# Patient Record
Sex: MALE | Race: WHITE
[De-identification: names, ages, dates, MRNs, and addresses within clinical notes are randomized per-mention and may not be internally consistent; named-entity substitution may affect disease eponyms.]

---

## 2018-08-29 ENCOUNTER — HOSPITAL ENCOUNTER (INPATIENT)
Dept: HOSPITAL 14 - H.ER | Age: 43
LOS: 2 days | Discharge: HOME | DRG: 427 | End: 2018-08-31
Attending: PSYCHIATRY & NEUROLOGY | Admitting: PSYCHIATRY & NEUROLOGY
Payer: MEDICAID

## 2018-08-29 DIAGNOSIS — F12.90: ICD-10-CM

## 2018-08-29 DIAGNOSIS — G43.909: ICD-10-CM

## 2018-08-29 DIAGNOSIS — G89.29: ICD-10-CM

## 2018-08-29 DIAGNOSIS — F43.20: Primary | ICD-10-CM

## 2018-08-29 DIAGNOSIS — E78.00: ICD-10-CM

## 2018-08-29 DIAGNOSIS — K21.9: ICD-10-CM

## 2018-08-29 DIAGNOSIS — E11.40: ICD-10-CM

## 2018-08-29 DIAGNOSIS — R45.851: ICD-10-CM

## 2018-08-29 DIAGNOSIS — Z79.4: ICD-10-CM

## 2018-08-29 DIAGNOSIS — Z88.0: ICD-10-CM

## 2018-08-29 LAB
ALBUMIN SERPL-MCNC: 4.6 G/DL (ref 3.5–5)
ALBUMIN/GLOB SERPL: 1.6 {RATIO} (ref 1–2.1)
ALT SERPL-CCNC: 33 U/L (ref 21–72)
AST SERPL-CCNC: 21 U/L (ref 17–59)
BASOPHILS # BLD AUTO: 0 K/UL (ref 0–0.2)
BASOPHILS NFR BLD: 0.8 % (ref 0–2)
BILIRUB UR-MCNC: NEGATIVE MG/DL
BUN SERPL-MCNC: 19 MG/DL (ref 9–20)
CALCIUM SERPL-MCNC: 9.2 MG/DL (ref 8.4–10.2)
COLOR UR: YELLOW
EOSINOPHIL # BLD AUTO: 0.1 K/UL (ref 0–0.7)
EOSINOPHIL NFR BLD: 1.5 % (ref 0–4)
ERYTHROCYTE [DISTWIDTH] IN BLOOD BY AUTOMATED COUNT: 13.2 % (ref 11.5–14.5)
GFR NON-AFRICAN AMERICAN: > 60
GLUCOSE UR STRIP-MCNC: 150 MG/DL
HGB BLD-MCNC: 15.5 G/DL (ref 12–18)
LEUKOCYTE ESTERASE UR-ACNC: (no result) LEU/UL
LYMPHOCYTES # BLD AUTO: 1.2 K/UL (ref 1–4.3)
LYMPHOCYTES NFR BLD AUTO: 26.9 % (ref 20–40)
MCH RBC QN AUTO: 31.4 PG (ref 27–31)
MCHC RBC AUTO-ENTMCNC: 34.4 G/DL (ref 33–37)
MCV RBC AUTO: 91.2 FL (ref 80–94)
MONOCYTES # BLD: 0.3 K/UL (ref 0–0.8)
MONOCYTES NFR BLD: 6.6 % (ref 0–10)
NEUTROPHILS # BLD: 2.8 K/UL (ref 1.8–7)
NEUTROPHILS NFR BLD AUTO: 64.2 % (ref 50–75)
NRBC BLD AUTO-RTO: 0 % (ref 0–0)
PH UR STRIP: 5 [PH] (ref 5–8)
PLATELET # BLD: 227 K/UL (ref 130–400)
PMV BLD AUTO: 9 FL (ref 7.2–11.7)
PROT UR STRIP-MCNC: 100 MG/DL
RBC # BLD AUTO: 4.93 MIL/UL (ref 4.4–5.9)
RBC # UR STRIP: NEGATIVE /UL
SP GR UR STRIP: 1.03 (ref 1–1.03)
SQUAMOUS EPITHIAL: < 1 /HPF (ref 0–5)
URINE CLARITY: (no result)
URINE HYALINE CAST: (no result) /HPF (ref 0–2)
UROBILINOGEN UR-MCNC: 2 MG/DL (ref 0.2–1)
WBC # BLD AUTO: 4.3 K/UL (ref 4.8–10.8)

## 2018-08-29 NOTE — ED PDOC
HPI: Psych/Substance Abuse


Time Seen by Provider: 18 19:37


Chief Complaint (Nursing): Psychiatric Evaluation


Chief Complaint (Provider): crisis eval


History Per: Patient, EMS


Additional Complaint(s): 


41 y/o male presents for crisis eval.  Patient had an argument at home with 

parents and patient states the neighbors called police and patient was told to 

come to ED.  Patient denies any thoughts of wanting to harm himself or other 

people.  As per EMS, patient's sister called the police after the patient told 

his sister that he wanted to harm himself.  Patient complains of chronic pain 

secondary to diabetic neuropathy.  He admits to smoking marijuana daily to help 

relieve chronic pain. Patient also states he has had frequent headaches lately 

and episodes of memory loss and he is currently under the care of a 

neurologist. He denies headache at present. 





PMD:  Dr. Israel





Past Medical History


Reviewed: Historical Data, Nursing Documentation, Vital Signs


Vital Signs: 





 Last Vital Signs











Temp  98.2 F   18 19:33


 


Pulse  102 H  18 19:33


 


Resp  20   18 19:33


 


BP  143/79   18 19:33


 


Pulse Ox  99   18 19:33














- Medical History


PMH: Anxiety, Diabetes, Migraine





- Family History


Family History: States: No Known Family Hx





- Living Arrangements


Living Arrangements: With Family





- Social History


Current smoker - smoking cessation education provided: No


Alcohol: None


Drugs: Cannabis





- Allergies


Allergies/Adverse Reactions: 


 Allergies











Allergy/AdvReac Type Severity Reaction Status Date / Time


 


Penicillins Allergy  RASH Verified 18 19:33














Review of Systems


ROS Statement: Except As Marked, All Systems Reviewed And Found Negative


Musculoskeletal: Positive for: Other (chronic neuropathic pain to legs)


Neurological: Positive for: Headache (frequent headaches), Other (memory loss).

  Negative for: Confusion, Seizures, Dizziness


Psych: Positive for: Suicidal ideation





Physical Exam





- Reviewed


Nursing Documentation Reviewed: Yes


Vital Signs Reviewed: Yes





- Physical Exam


Appears: Positive for: Well, Non-toxic, No Acute Distress


Skin: Positive for: Normal Color.  Negative for: Rash


Eye Exam: Positive for: Normal appearance


Cardiovascular/Chest: Positive for: Regular Rate, Rhythm


Respiratory: Positive for: Normal Breath Sounds.  Negative for: Wheezing, 

Respiratory Distress


Back: Positive for: Normal Inspection


Extremity: Positive for: Normal ROM


Neurologic/Psych: Positive for: Alert, CNs II-XII (grossly intact), Oriented, 

Gait (steady with cane used at baseline).  Negative for: Motor/Sensory Deficits

, Aphasia, Facial Droop





- Laboratory Results


Result Diagrams: 


 18 20:46





 18 20:46





- ECG


Interpretation Of ECG: 


NSR 66 bpm, no acute finding, reviewed by PA and ED attending


O2 Sat by Pulse Oximetry: 99


Pulse Ox Interpretation: Normal





- Other Rad


  ** CXR


X-Ray: Interpreted by Me, Viewed By Me


X-Ray Interpretation: no acute finding





  ** CT head


X-Ray: Read By Radiologist


X-Ray Interpretation: no acute finding





Medical Decision Making


Medical Decision Makin year old male with suicidal ideation





Plan:


1:1 observation


Crisis eval


CBC


CMP 


BAL


UDS


UA


CT head





As per crisis counselor and psychiatrist on call, Dr. Moon, patient does 

meet criteria for admission. Patient agrees and signed himself in. Patient is 

medically stable for psychiatric admission.





Disposition





- Clinical Impression


Clinical Impression: 


 Adjustment disorder








- Patient ED Disposition


Is Patient to be Admitted: Yes





- Disposition


Disposition Time: 00:02


Condition: FAIR


Forms:  [facility] (English)





Results





- Lab Results


Lab Results: 

















  18





  20:46 20:46 20:46


 


WBC    4.3 L


 


RBC    4.93


 


Hgb    15.5


 


Hct    44.9


 


MCV    91.2


 


MCH    31.4 H


 


MCHC    34.4


 


RDW    13.2


 


Plt Count    227


 


MPV    9.0


 


Neut % (Auto)    64.2


 


Lymph % (Auto)    26.9


 


Mono % (Auto)    6.6


 


Eos % (Auto)    1.5


 


Baso % (Auto)    0.8


 


Neut # (Auto)    2.8


 


Lymph # (Auto)    1.2


 


Mono # (Auto)    0.3


 


Eos # (Auto)    0.1


 


Baso # (Auto)    0.0


 


Sodium   


 


Potassium   


 


Chloride   


 


Carbon Dioxide   


 


Anion Gap   


 


BUN   


 


Creatinine   


 


Est GFR ( Amer)   


 


Est GFR (Non-Af Amer)   


 


POC Glucose (mg/dL)   


 


Random Glucose   


 


Calcium   


 


Total Bilirubin   


 


AST   


 


ALT   


 


Alkaline Phosphatase   


 


Total Protein   


 


Albumin   


 


Globulin   


 


Albumin/Globulin Ratio   


 


Urine Color  Yellow  


 


Urine Clarity  Slighty-cloudy  


 


Urine pH  5.0  


 


Ur Specific Gravity  1.030  


 


Urine Protein  100  


 


Urine Glucose (UA)  150  


 


Urine Ketones  20  


 


Urine Blood  Negative  


 


Urine Nitrate  Negative  


 


Urine Bilirubin  Negative  


 


Urine Urobilinogen  2.0  


 


Ur Leukocyte Esterase  Trace  


 


Urine RBC (Auto)  1  


 


Urine Microscopic WBC  5  


 


Ur Squamous Epith Cells  < 1  


 


Hyaline Casts  0-2  


 


Urine Opiates Screen   Negative 


 


Urine Methadone Screen   Negative 


 


Ur Barbiturates Screen   Negative 


 


Ur Phencyclidine Scrn   Negative 


 


Ur Amphetamines Screen   Positive H 


 


U Benzodiazepines Scrn   Negative 


 


U Oth Cocaine Metabols   Negative 


 


U Cannabinoids Screen   Positive H 


 


Alcohol, Quantitative   














  18





  20:46 20:24


 


WBC  


 


RBC  


 


Hgb  


 


Hct  


 


MCV  


 


MCH  


 


MCHC  


 


RDW  


 


Plt Count  


 


MPV  


 


Neut % (Auto)  


 


Lymph % (Auto)  


 


Mono % (Auto)  


 


Eos % (Auto)  


 


Baso % (Auto)  


 


Neut # (Auto)  


 


Lymph # (Auto)  


 


Mono # (Auto)  


 


Eos # (Auto)  


 


Baso # (Auto)  


 


Sodium  140 


 


Potassium  3.9 


 


Chloride  106 


 


Carbon Dioxide  24 


 


Anion Gap  14 


 


BUN  19 


 


Creatinine  0.6 L 


 


Est GFR ( Amer)  > 60 


 


Est GFR (Non-Af Amer)  > 60 


 


POC Glucose (mg/dL)   151 H


 


Random Glucose  167 H 


 


Calcium  9.2 


 


Total Bilirubin  1.0 


 


AST  21 


 


ALT  33 


 


Alkaline Phosphatase  64 


 


Total Protein  7.5 


 


Albumin  4.6 


 


Globulin  2.9 


 


Albumin/Globulin Ratio  1.6 


 


Urine Color  


 


Urine Clarity  


 


Urine pH  


 


Ur Specific Gravity  


 


Urine Protein  


 


Urine Glucose (UA)  


 


Urine Ketones  


 


Urine Blood  


 


Urine Nitrate  


 


Urine Bilirubin  


 


Urine Urobilinogen  


 


Ur Leukocyte Esterase  


 


Urine RBC (Auto)  


 


Urine Microscopic WBC  


 


Ur Squamous Epith Cells  


 


Hyaline Casts  


 


Urine Opiates Screen  


 


Urine Methadone Screen  


 


Ur Barbiturates Screen  


 


Ur Phencyclidine Scrn  


 


Ur Amphetamines Screen  


 


U Benzodiazepines Scrn  


 


U Oth Cocaine Metabols  


 


U Cannabinoids Screen  


 


Alcohol, Quantitative  < 10

## 2018-08-30 VITALS — OXYGEN SATURATION: 100 %

## 2018-08-30 LAB
HDLC SERPL-MCNC: 55 MG/DL (ref 30–70)
LDLC SERPL-MCNC: 58 MG/DL (ref 0–129)
T4 SERPL-MCNC: 7.94 UG/DL (ref 5.5–11)

## 2018-08-30 PROCEDURE — HZ56ZZZ INDIVIDUAL PSYCHOTHERAPY FOR SUBSTANCE ABUSE TREATMENT, PSYCHOEDUCATION: ICD-10-PCS | Performed by: PSYCHIATRY & NEUROLOGY

## 2018-08-30 NOTE — CP.PCM.CON
History of Present Illness





- History of Present Illness


History of Present Illness: 





Medical consult for DM- management





CC: says had fight with sister who used the fact that he said he would not want 

to live against him





HPI: 43 yo Male pmh of DM and DM neuropathy here for suicidal ideation. no 

medical complaints. no cp no sob, no fevers, no chills, no pain. 





PMH: DM on insulin


DM neuropathy


high chol


Gerd


Migranes





PSH: 


none





FH: mental ilness father





SH: vape marijuana


ETOH


no drugs





Allergies: 


PCH


propelyne glycol





PMD: Dr. ANITA Israel (male)




















Review of Systems





- Review of Systems


All systems: reviewed and no additional remarkable complaints except





Past Patient History





- Past Social History


Smoking Status: Never Smoked


Alcohol: None


Drugs: Cannabis


Home Situation {Lives}: With Family





- CARDIAC


Hx Cardiac Disorders: Yes


Hx Hypercholesterolemia: Yes





- PULMONARY


Hx Respiratory Disorders: No





- NEUROLOGICAL


Hx Neurological Disorder: Yes


Hx Migraine: Yes





- HEENT


Hx HEENT Problems: Yes


Hx Glaucoma: Yes





- RENAL


Hx Chronic Kidney Disease: No





- ENDOCRINE/METABOLIC


Hx Endocrine Disorders: Yes (DIABETES)





- HEMATOLOGICAL/ONCOLOGICAL


Hx Blood Disorders: No





- INTEGUMENTARY


Hx Dermatological Problems: No





- MUSCULOSKELETAL/RHEUMATOLOGICAL


Hx Musculoskeletal Disorders: Yes


Hx Unsteady Gait: Yes (uses cane)


Other/Comment: diabetic neuropathy





- GASTROINTESTINAL


Hx Gastrointestinal Disorders: No





- GENITOURINARY/GYNECOLOGICAL


Hx Genitourinary Disorders: No





- PSYCHIATRIC


Hx Psychophysiologic Disorder: Yes





- SURGICAL HISTORY


Hx Surgeries: No





- ANESTHESIA


Hx Anesthesia: No





Meds


Allergies/Adverse Reactions: 


 Allergies











Allergy/AdvReac Type Severity Reaction Status Date / Time


 


Penicillins Allergy  RASH Verified 08/29/18 19:33














- Medications


Medications: 


 Current Medications





Acetaminophen (Tylenol 325mg Tab)  650 mg PO Q4 PRN


   PRN Reason: Pain, moderate (4-7)


Al Hydrox/Mg Hydrox/Simethicone (Maalox Plus 30 Ml)  30 ml PO Q4 PRN


   PRN Reason: Dyspepsia


Cyclobenzaprine HCl (Flexeril)  10 mg PO HS PRN


   PRN Reason: Pain, moderate (4-7)


Dextrose (Dextrose 50% Inj)  0 ml IV STAT PRN; Protocol


   PRN Reason: Hypoglycemia Protocol


Dextrose (Glutose 15)  0 gm PO ONCE PRN; Protocol


   PRN Reason: Hypoglycemia Protocol


Diphenhydramine HCl (Benadryl)  50 mg IM Q6 PRN


   PRN Reason: Extrapyramidal S/S Unable PO


Diphenhydramine HCl (Benadryl)  50 mg PO Q6 PRN


   PRN Reason: Extrapyramidal Symptoms


Diphenhydramine HCl (Benadryl)  50 mg PO HS PRN


   PRN Reason: Sleep


Gabapentin (Neurontin)  400 mg PO TID [facility]


   Last Admin: 08/30/18 17:40 Dose:  400 mg


Glucagon (Glucagen Diagnostic Kit)  0 mg IM STAT PRN; Protocol


   PRN Reason: Hypoglycemia Protocol


Haloperidol (Haldol)  5 mg PO Q4 PRN


   PRN Reason: Agitation


Haloperidol Lactate (Haldol)  5 mg IM Q4 PRN


   PRN Reason: Agitation, Unable to Take PO


Home Med (Diclofenac Sodium [Diclofenac Sodium])  50 mg PO BID [facility]


Home Med (Sumatriptan Succinate [Imitrex Tab])  100 mg PO BID PRN


   PRN Reason: Insomnia


Insulin Human Regular (Humulin R)  0 units SC ACHS WANDA


   PRN Reason: Protocol


Lorazepam (Ativan)  1 mg IM Q8 PRN


   PRN Reason: Anxiety/Agitation,Unable PO


Lorazepam (Ativan)  0.5 mg PO TID PRN


   PRN Reason: Anxiety


Magnesium Hydroxide (Milk Of Magnesia)  30 ml PO HS PRN


   PRN Reason: Constipation


Sumatriptan Succinate (Imitrex Inj)  6 mg SC DAILY PRN


   PRN Reason: Other











Physical Exam





- Head Exam


Head Exam: ATRAUMATIC, NORMAL INSPECTION, NORMOCEPHALIC





- Eye Exam


Eye Exam: Normal appearance





- ENT Exam


ENT Exam: Mucous Membranes Moist





- Respiratory Exam


Respiratory Exam: Clear to Auscultation Bilateral, NORMAL BREATHING PATTERN.  

absent: Rales, Rhonchi, Wheezes





- Cardiovascular Exam


Cardiovascular Exam: REGULAR RHYTHM, +S1, +S2





- GI/Abdominal Exam


GI & Abdominal Exam: Normal Bowel Sounds, Soft.  absent: Tenderness





- Extremities Exam


Extremities exam: Positive for: normal inspection





- Neurological Exam


Neurological exam: Abnormal Gait, Alert, CN II-XII Intact, Oriented x3





- Psychiatric Exam


Psychiatric exam: Depressed





- Skin


Skin Exam: Normal Color





Results





- Vital Signs


Recent Vital Signs: 


 Last Vital Signs











Temp  98.6 F   08/30/18 16:20


 


Pulse  71   08/30/18 16:20


 


Resp  20   08/30/18 16:20


 


BP  109/58 L  08/30/18 16:20


 


Pulse Ox  100   08/30/18 00:30














- Labs


Result Diagrams: 


 08/29/18 20:46





 08/29/18 20:46


Labs: 


 Laboratory Results - last 24 hr











  08/29/18 08/29/18 08/29/18





  20:24 20:46 20:46


 


WBC    4.3 L


 


RBC    4.93


 


Hgb    15.5


 


Hct    44.9


 


MCV    91.2


 


MCH    31.4 H


 


MCHC    34.4


 


RDW    13.2


 


Plt Count    227


 


MPV    9.0


 


Neut % (Auto)    64.2


 


Lymph % (Auto)    26.9


 


Mono % (Auto)    6.6


 


Eos % (Auto)    1.5


 


Baso % (Auto)    0.8


 


Neut # (Auto)    2.8


 


Lymph # (Auto)    1.2


 


Mono # (Auto)    0.3


 


Eos # (Auto)    0.1


 


Baso # (Auto)    0.0


 


Sodium   140 


 


Potassium   3.9 


 


Chloride   106 


 


Carbon Dioxide   24 


 


Anion Gap   14 


 


BUN   19 


 


Creatinine   0.6 L 


 


Est GFR ( Amer)   > 60 


 


Est GFR (Non-Af Amer)   > 60 


 


POC Glucose (mg/dL)  151 H  


 


Random Glucose   167 H 


 


Hemoglobin A1c   


 


Calcium   9.2 


 


Total Bilirubin   1.0 


 


AST   21 


 


ALT   33 


 


Alkaline Phosphatase   64 


 


Total Protein   7.5 


 


Albumin   4.6 


 


Globulin   2.9 


 


Albumin/Globulin Ratio   1.6 


 


Triglycerides   


 


Cholesterol   


 


LDL Cholesterol Direct   


 


HDL Cholesterol   


 


Thyroxine (T4)   


 


TSH 3rd Generation   


 


Urine Color   


 


Urine Clarity   


 


Urine pH   


 


Ur Specific Gravity   


 


Urine Protein   


 


Urine Glucose (UA)   


 


Urine Ketones   


 


Urine Blood   


 


Urine Nitrate   


 


Urine Bilirubin   


 


Urine Urobilinogen   


 


Ur Leukocyte Esterase   


 


Urine RBC (Auto)   


 


Urine Microscopic WBC   


 


Ur Squamous Epith Cells   


 


Hyaline Casts   


 


Urine Opiates Screen   


 


Urine Methadone Screen   


 


Ur Barbiturates Screen   


 


Ur Phencyclidine Scrn   


 


Ur Amphetamines Screen   


 


U Benzodiazepines Scrn   


 


U Oth Cocaine Metabols   


 


U Cannabinoids Screen   


 


Alcohol, Quantitative   < 10 


 


RPR   














  08/29/18 08/29/18 08/30/18





  20:46 20:46 02:23


 


WBC   


 


RBC   


 


Hgb   


 


Hct   


 


MCV   


 


MCH   


 


MCHC   


 


RDW   


 


Plt Count   


 


MPV   


 


Neut % (Auto)   


 


Lymph % (Auto)   


 


Mono % (Auto)   


 


Eos % (Auto)   


 


Baso % (Auto)   


 


Neut # (Auto)   


 


Lymph # (Auto)   


 


Mono # (Auto)   


 


Eos # (Auto)   


 


Baso # (Auto)   


 


Sodium   


 


Potassium   


 


Chloride   


 


Carbon Dioxide   


 


Anion Gap   


 


BUN   


 


Creatinine   


 


Est GFR ( Amer)   


 


Est GFR (Non-Af Amer)   


 


POC Glucose (mg/dL)    178 H


 


Random Glucose   


 


Hemoglobin A1c   


 


Calcium   


 


Total Bilirubin   


 


AST   


 


ALT   


 


Alkaline Phosphatase   


 


Total Protein   


 


Albumin   


 


Globulin   


 


Albumin/Globulin Ratio   


 


Triglycerides   


 


Cholesterol   


 


LDL Cholesterol Direct   


 


HDL Cholesterol   


 


Thyroxine (T4)   


 


TSH 3rd Generation   


 


Urine Color   Yellow 


 


Urine Clarity   Slighty-cloudy 


 


Urine pH   5.0 


 


Ur Specific Gravity   1.030 


 


Urine Protein   100 


 


Urine Glucose (UA)   150 


 


Urine Ketones   20 


 


Urine Blood   Negative 


 


Urine Nitrate   Negative 


 


Urine Bilirubin   Negative 


 


Urine Urobilinogen   2.0 


 


Ur Leukocyte Esterase   Trace 


 


Urine RBC (Auto)   1 


 


Urine Microscopic WBC   5 


 


Ur Squamous Epith Cells   < 1 


 


Hyaline Casts   0-2 


 


Urine Opiates Screen  Negative  


 


Urine Methadone Screen  Negative  


 


Ur Barbiturates Screen  Negative  


 


Ur Phencyclidine Scrn  Negative  


 


Ur Amphetamines Screen  Positive H  


 


U Benzodiazepines Scrn  Negative  


 


U Oth Cocaine Metabols  Negative  


 


U Cannabinoids Screen  Positive H  


 


Alcohol, Quantitative   


 


RPR   














  08/30/18 08/30/18 08/30/18





  06:38 06:38 06:38


 


WBC   


 


RBC   


 


Hgb   


 


Hct   


 


MCV   


 


MCH   


 


MCHC   


 


RDW   


 


Plt Count   


 


MPV   


 


Neut % (Auto)   


 


Lymph % (Auto)   


 


Mono % (Auto)   


 


Eos % (Auto)   


 


Baso % (Auto)   


 


Neut # (Auto)   


 


Lymph # (Auto)   


 


Mono # (Auto)   


 


Eos # (Auto)   


 


Baso # (Auto)   


 


Sodium   


 


Potassium   


 


Chloride   


 


Carbon Dioxide   


 


Anion Gap   


 


BUN   


 


Creatinine   


 


Est GFR ( Amer)   


 


Est GFR (Non-Af Amer)   


 


POC Glucose (mg/dL)   


 


Random Glucose   


 


Hemoglobin A1c   7.0 H 


 


Calcium   


 


Total Bilirubin   


 


AST   


 


ALT   


 


Alkaline Phosphatase   


 


Total Protein   


 


Albumin   


 


Globulin   


 


Albumin/Globulin Ratio   


 


Triglycerides  69  


 


Cholesterol  142  


 


LDL Cholesterol Direct  58  


 


HDL Cholesterol  55  


 


Thyroxine (T4)  7.94  


 


TSH 3rd Generation  2.33  


 


Urine Color   


 


Urine Clarity   


 


Urine pH   


 


Ur Specific Gravity   


 


Urine Protein   


 


Urine Glucose (UA)   


 


Urine Ketones   


 


Urine Blood   


 


Urine Nitrate   


 


Urine Bilirubin   


 


Urine Urobilinogen   


 


Ur Leukocyte Esterase   


 


Urine RBC (Auto)   


 


Urine Microscopic WBC   


 


Ur Squamous Epith Cells   


 


Hyaline Casts   


 


Urine Opiates Screen   


 


Urine Methadone Screen   


 


Ur Barbiturates Screen   


 


Ur Phencyclidine Scrn   


 


Ur Amphetamines Screen   


 


U Benzodiazepines Scrn   


 


U Oth Cocaine Metabols   


 


U Cannabinoids Screen   


 


Alcohol, Quantitative   


 


RPR    Nonreactive














  08/30/18 08/30/18





  13:59 16:07


 


WBC  


 


RBC  


 


Hgb  


 


Hct  


 


MCV  


 


MCH  


 


MCHC  


 


RDW  


 


Plt Count  


 


MPV  


 


Neut % (Auto)  


 


Lymph % (Auto)  


 


Mono % (Auto)  


 


Eos % (Auto)  


 


Baso % (Auto)  


 


Neut # (Auto)  


 


Lymph # (Auto)  


 


Mono # (Auto)  


 


Eos # (Auto)  


 


Baso # (Auto)  


 


Sodium  


 


Potassium  


 


Chloride  


 


Carbon Dioxide  


 


Anion Gap  


 


BUN  


 


Creatinine  


 


Est GFR ( Amer)  


 


Est GFR (Non-Af Amer)  


 


POC Glucose (mg/dL)  189 H  170 H


 


Random Glucose  


 


Hemoglobin A1c  


 


Calcium  


 


Total Bilirubin  


 


AST  


 


ALT  


 


Alkaline Phosphatase  


 


Total Protein  


 


Albumin  


 


Globulin  


 


Albumin/Globulin Ratio  


 


Triglycerides  


 


Cholesterol  


 


LDL Cholesterol Direct  


 


HDL Cholesterol  


 


Thyroxine (T4)  


 


TSH 3rd Generation  


 


Urine Color  


 


Urine Clarity  


 


Urine pH  


 


Ur Specific Gravity  


 


Urine Protein  


 


Urine Glucose (UA)  


 


Urine Ketones  


 


Urine Blood  


 


Urine Nitrate  


 


Urine Bilirubin  


 


Urine Urobilinogen  


 


Ur Leukocyte Esterase  


 


Urine RBC (Auto)  


 


Urine Microscopic WBC  


 


Ur Squamous Epith Cells  


 


Hyaline Casts  


 


Urine Opiates Screen  


 


Urine Methadone Screen  


 


Ur Barbiturates Screen  


 


Ur Phencyclidine Scrn  


 


Ur Amphetamines Screen  


 


U Benzodiazepines Scrn  


 


U Oth Cocaine Metabols  


 


U Cannabinoids Screen  


 


Alcohol, Quantitative  


 


RPR  














Assessment & Plan





- Assessment and Plan (Free Text)


Assessment: 





1. dm-2 -on Insulin


 iss


lantus dec to 20u due to poor intake here





2. suicidal ideation and depression - management by psych





3. Dm - 2 with neuropathy

## 2018-08-30 NOTE — CT
Date of service: 



08/29/2018



PROCEDURE:  CT HEAD WITHOUT CONTRAST.



HISTORY:

frequent headaches, memory loss



COMPARISON:

Noncontrast head CT performed 3/13/15.



TECHNIQUE:

Axial computed tomography images were obtained through the head/brain 

without intravenous contrast.  



Radiation dose:



Total exam DLP = 784.91 mGy-cm.



This CT exam was performed using one or more of the following dose 

reduction techniques: Automated exposure control, adjustment of the 

mA and/or kV according to patient size, and/or use of iterative 

reconstruction technique.



FINDINGS:

Streak artifact obscures evaluation of the skullbase. 



HEMORRHAGE:

No intracranial hemorrhage. 



BRAIN:

No mass effect or edema.  The gray-white matter differentiation 

appears intact.Please note that MRI with diffusion imaging is more 

sensitive in the detection of acute ischemic event.



VENTRICLES:

No hydrocephalus. 



CALVARIUM:

Unremarkable.



PARANASAL SINUSES:

Unremarkable as visualized. No significant inflammatory changes.



MASTOID AIR CELLS:

Unremarkable as visualized. No inflammatory changes.



OTHER FINDINGS:

None.



IMPRESSION:

No acute intracranial pathology identified.



Preliminary impression was provided by virtual radiologic.

## 2018-08-30 NOTE — CARD
--------------- APPROVED REPORT --------------





Date of service: 08/29/2018



<Conclusion>

Normal sinus rhythm

Possible Left atrial enlargement

Borderline ECG

## 2018-08-30 NOTE — PCM.BM
<LukaszXavier ramos - Last Filed: 08/30/18 01:45>





Treatment Plan Problems





- Problems identified on initial assessmt


  ** Hopelessness/Helplessness


Date Initiated: 08/30/18


Time Initiated: 01:46


Assessment reference: NA


Status: Active





  ** Ineffective Impulse Control


Date Initiated: 08/30/18


Time Initiated: 01:46


Assessment reference: NA


Status: Active





  ** Feelings of Worthlessness


Date Initiated: 08/30/18


Time Initiated: 01:46


Assessment reference: NA


Status: Active





Treatment assets and liabiliti


Patient Assests: cooperative, self-reliant, ADL independent, good support system

, negotiates basic needs


Patient Liabilities: physical pain, relationship conflicts, substance abuse, 

medical problems





- Milieu Protocol


Maintain good personal hygiene: every shift Encourage regular showers, every 

shift Remind patient to perform daily oral care, every shift Assist patient to 

perform ADL's


Maintain personal safety: daily Educate patient to report safety concerns to 

staff, daily Monitor environment for contraband/sharps


Medication safety: Monitor for expected outcome, potential side effects: daily, 

Assess barriers to learning: daily, Assess readiness for medication education: 

daily





<Candida Griffith - Last Filed: 08/30/18 11:13>





- Diagnosis


(1) Adjustment disorder


Status: Acute   


Interventions: 


Medication management, Individual and group therapy, Psychoeducation


08/30/18 11:13
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
## 2018-08-31 VITALS
HEART RATE: 87 BPM | TEMPERATURE: 97.3 F | DIASTOLIC BLOOD PRESSURE: 67 MMHG | RESPIRATION RATE: 18 BRPM | SYSTOLIC BLOOD PRESSURE: 98 MMHG
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
## 2022-11-14 ENCOUNTER — NEW REFERRAL (OUTPATIENT)
Dept: URBAN - METROPOLITAN AREA CLINIC 19 | Facility: CLINIC | Age: 47
End: 2022-11-14

## 2022-11-14 DIAGNOSIS — H26.493: ICD-10-CM

## 2022-11-14 DIAGNOSIS — H35.432: ICD-10-CM

## 2022-11-14 DIAGNOSIS — E11.3293: ICD-10-CM

## 2022-11-14 DIAGNOSIS — H40.1134: ICD-10-CM

## 2022-11-14 PROCEDURE — 92202 OPSCPY EXTND ON/MAC DRAW: CPT

## 2022-11-14 PROCEDURE — 92134 CPTRZ OPH DX IMG PST SGM RTA: CPT

## 2022-11-14 PROCEDURE — 99244 OFF/OP CNSLTJ NEW/EST MOD 40: CPT

## 2022-11-14 ASSESSMENT — VISUAL ACUITY
OD_CC: 20/25-2
OS_CC: 20/30

## 2022-11-14 ASSESSMENT — TONOMETRY
OD_IOP_MMHG: 8
OS_IOP_MMHG: 13

## (undated) RX ORDER — DORZOLAMIDE HYDROCHLORIDE AND TIMOLOL MALEATE 20; 5 MG/ML; MG/ML: 1 SOLUTION/ DROPS OPHTHALMIC TWICE A DAY